# Patient Record
Sex: MALE | Race: WHITE | NOT HISPANIC OR LATINO | Employment: FULL TIME | URBAN - METROPOLITAN AREA
[De-identification: names, ages, dates, MRNs, and addresses within clinical notes are randomized per-mention and may not be internally consistent; named-entity substitution may affect disease eponyms.]

---

## 2020-03-25 ENCOUNTER — APPOINTMENT (OUTPATIENT)
Dept: RADIOLOGY | Facility: CLINIC | Age: 40
End: 2020-03-25
Payer: COMMERCIAL

## 2020-03-25 ENCOUNTER — OFFICE VISIT (OUTPATIENT)
Dept: FAMILY MEDICINE CLINIC | Facility: CLINIC | Age: 40
End: 2020-03-25
Payer: COMMERCIAL

## 2020-03-25 VITALS
BODY MASS INDEX: 22.51 KG/M2 | HEART RATE: 97 BPM | SYSTOLIC BLOOD PRESSURE: 128 MMHG | WEIGHT: 166.2 LBS | TEMPERATURE: 99.2 F | OXYGEN SATURATION: 97 % | DIASTOLIC BLOOD PRESSURE: 80 MMHG | HEIGHT: 72 IN | RESPIRATION RATE: 16 BRPM

## 2020-03-25 DIAGNOSIS — R07.89 CHEST DISCOMFORT: ICD-10-CM

## 2020-03-25 DIAGNOSIS — R07.89 CHEST DISCOMFORT: Primary | ICD-10-CM

## 2020-03-25 DIAGNOSIS — M62.838 TRAPEZIUS MUSCLE SPASM: ICD-10-CM

## 2020-03-25 PROCEDURE — 99203 OFFICE O/P NEW LOW 30 MIN: CPT | Performed by: FAMILY MEDICINE

## 2020-03-25 PROCEDURE — 71046 X-RAY EXAM CHEST 2 VIEWS: CPT

## 2020-03-25 PROCEDURE — 1036F TOBACCO NON-USER: CPT | Performed by: FAMILY MEDICINE

## 2020-03-25 PROCEDURE — 93000 ELECTROCARDIOGRAM COMPLETE: CPT | Performed by: FAMILY MEDICINE

## 2020-03-25 PROCEDURE — 3008F BODY MASS INDEX DOCD: CPT | Performed by: FAMILY MEDICINE

## 2020-03-25 RX ORDER — TIZANIDINE 4 MG/1
4 TABLET ORAL
Qty: 15 TABLET | Refills: 0 | Status: SHIPPED | OUTPATIENT
Start: 2020-03-25
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
## 2022-03-30 ENCOUNTER — TELEPHONE (OUTPATIENT)
Dept: FAMILY MEDICINE CLINIC | Facility: CLINIC | Age: 42
End: 2022-03-30

## 2025-01-13 ENCOUNTER — TELEPHONE (OUTPATIENT)
Dept: FAMILY MEDICINE CLINIC | Facility: CLINIC | Age: 45
End: 2025-01-13

## 2025-01-13 NOTE — TELEPHONE ENCOUNTER
Please remove Dr. Gonzalez as PCP.  Patient established care with [facility].

## 2025-01-26 NOTE — TELEPHONE ENCOUNTER
01/25/25 10:03 PM        The office's request has been received, reviewed, and the patient chart updated. The PCP has successfully been removed with a patient attribution note. This message will now be completed.        Thank you  Avis Harrison

## 2025-04-02 ENCOUNTER — APPOINTMENT (EMERGENCY)
Dept: RADIOLOGY | Facility: HOSPITAL | Age: 45
End: 2025-04-02
Payer: COMMERCIAL

## 2025-04-02 ENCOUNTER — HOSPITAL ENCOUNTER (EMERGENCY)
Facility: HOSPITAL | Age: 45
Discharge: HOME | End: 2025-04-03
Attending: EMERGENCY MEDICINE
Payer: COMMERCIAL

## 2025-04-02 DIAGNOSIS — R00.2 PALPITATIONS: ICD-10-CM

## 2025-04-02 DIAGNOSIS — K21.9 GERD (GASTROESOPHAGEAL REFLUX DISEASE): ICD-10-CM

## 2025-04-02 DIAGNOSIS — R07.9 CHEST PAIN: Primary | ICD-10-CM

## 2025-04-02 LAB
BASOPHILS # BLD AUTO: 0.05 THOUSANDS/ÂΜL (ref 0–0.1)
BASOPHILS NFR BLD AUTO: 1 % (ref 0–1)
EOSINOPHIL # BLD AUTO: 0.32 THOUSAND/ÂΜL (ref 0–0.61)
EOSINOPHIL NFR BLD AUTO: 3 % (ref 0–6)
ERYTHROCYTE [DISTWIDTH] IN BLOOD BY AUTOMATED COUNT: 12.2 % (ref 11.6–15.1)
HCT VFR BLD AUTO: 40.5 % (ref 36.5–49.3)
HGB BLD-MCNC: 13.7 G/DL (ref 12–17)
IMM GRANULOCYTES # BLD AUTO: 0.04 THOUSAND/UL (ref 0–0.2)
IMM GRANULOCYTES NFR BLD AUTO: 0 % (ref 0–2)
LYMPHOCYTES # BLD AUTO: 1.61 THOUSANDS/ÂΜL (ref 0.6–4.47)
LYMPHOCYTES NFR BLD AUTO: 16 % (ref 14–44)
MCH RBC QN AUTO: 30.6 PG (ref 26.8–34.3)
MCHC RBC AUTO-ENTMCNC: 33.8 G/DL (ref 31.4–37.4)
MCV RBC AUTO: 91 FL (ref 82–98)
MONOCYTES # BLD AUTO: 0.99 THOUSAND/ÂΜL (ref 0.17–1.22)
MONOCYTES NFR BLD AUTO: 10 % (ref 4–12)
NEUTROPHILS # BLD AUTO: 6.99 THOUSANDS/ÂΜL (ref 1.85–7.62)
NEUTS SEG NFR BLD AUTO: 70 % (ref 43–75)
NRBC BLD AUTO-RTO: 0 /100 WBCS
PLATELET # BLD AUTO: 319 THOUSANDS/UL (ref 149–390)
PMV BLD AUTO: 10.5 FL (ref 8.9–12.7)
RBC # BLD AUTO: 4.47 MILLION/UL (ref 3.88–5.62)
WBC # BLD AUTO: 10 THOUSAND/UL (ref 4.31–10.16)

## 2025-04-02 PROCEDURE — 71045 X-RAY EXAM CHEST 1 VIEW: CPT

## 2025-04-02 PROCEDURE — 96372 THER/PROPH/DIAG INJ SC/IM: CPT

## 2025-04-02 PROCEDURE — 85025 COMPLETE CBC W/AUTO DIFF WBC: CPT

## 2025-04-02 PROCEDURE — 36415 COLL VENOUS BLD VENIPUNCTURE: CPT

## 2025-04-02 PROCEDURE — 99285 EMERGENCY DEPT VISIT HI MDM: CPT | Performed by: EMERGENCY MEDICINE

## 2025-04-02 PROCEDURE — 80048 BASIC METABOLIC PNL TOTAL CA: CPT

## 2025-04-02 PROCEDURE — 84484 ASSAY OF TROPONIN QUANT: CPT

## 2025-04-02 PROCEDURE — 93005 ELECTROCARDIOGRAM TRACING: CPT

## 2025-04-02 PROCEDURE — 99285 EMERGENCY DEPT VISIT HI MDM: CPT

## 2025-04-02 PROCEDURE — 84443 ASSAY THYROID STIM HORMONE: CPT

## 2025-04-02 RX ORDER — FAMOTIDINE 20 MG/1
20 TABLET, FILM COATED ORAL ONCE
Status: COMPLETED | OUTPATIENT
Start: 2025-04-02 | End: 2025-04-02

## 2025-04-02 RX ORDER — FAMOTIDINE 20 MG/1
20 TABLET, FILM COATED ORAL
Qty: 14 TABLET | Refills: 0 | Status: CANCELLED | OUTPATIENT
Start: 2025-04-02 | End: 2025-04-16

## 2025-04-02 RX ORDER — KETOROLAC TROMETHAMINE 30 MG/ML
15 INJECTION, SOLUTION INTRAMUSCULAR; INTRAVENOUS ONCE
Status: COMPLETED | OUTPATIENT
Start: 2025-04-02 | End: 2025-04-02

## 2025-04-02 RX ORDER — MAGNESIUM HYDROXIDE/ALUMINUM HYDROXICE/SIMETHICONE 120; 1200; 1200 MG/30ML; MG/30ML; MG/30ML
30 SUSPENSION ORAL ONCE
Status: COMPLETED | OUTPATIENT
Start: 2025-04-02 | End: 2025-04-02

## 2025-04-02 RX ORDER — SUCRALFATE ORAL 1 G/10ML
1 SUSPENSION ORAL 4 TIMES DAILY
Qty: 414 ML | Refills: 0 | Status: CANCELLED | OUTPATIENT
Start: 2025-04-02

## 2025-04-02 RX ADMIN — FAMOTIDINE 20 MG: 20 TABLET, FILM COATED ORAL at 23:41

## 2025-04-02 RX ADMIN — ALUMINUM HYDROXIDE, MAGNESIUM HYDROXIDE, AND DIMETHICONE 30 ML: 200; 20; 200 SUSPENSION ORAL at 23:41

## 2025-04-02 RX ADMIN — KETOROLAC TROMETHAMINE 15 MG: 30 INJECTION, SOLUTION INTRAMUSCULAR at 23:41

## 2025-04-02 NOTE — Clinical Note
Antonio Avila was seen and treated in our emergency department on 4/2/2025.    No restrictions    Other - See Comments    N/A    Diagnosis: Chest pain, palpitations    Antonio  is off the rest of the shift today, may return to work on return date.    He may return on this date: 04/04/2025         If you have any questions or concerns, please don't hesitate to call.      Yeison Sahni MD    ______________________________           _______________          _______________  Hospital Representative                              Date                                Time

## 2025-04-03 VITALS
SYSTOLIC BLOOD PRESSURE: 110 MMHG | OXYGEN SATURATION: 95 % | TEMPERATURE: 98.1 F | DIASTOLIC BLOOD PRESSURE: 71 MMHG | HEART RATE: 71 BPM | RESPIRATION RATE: 18 BRPM

## 2025-04-03 LAB
ANION GAP SERPL CALCULATED.3IONS-SCNC: 8 MMOL/L (ref 4–13)
BUN SERPL-MCNC: 19 MG/DL (ref 5–25)
CALCIUM SERPL-MCNC: 9 MG/DL (ref 8.4–10.2)
CARDIAC TROPONIN I PNL SERPL HS: <2 NG/L (ref ?–50)
CHLORIDE SERPL-SCNC: 104 MMOL/L (ref 96–108)
CO2 SERPL-SCNC: 27 MMOL/L (ref 21–32)
CREAT SERPL-MCNC: 0.92 MG/DL (ref 0.6–1.3)
GFR SERPL CREATININE-BSD FRML MDRD: 101 ML/MIN/1.73SQ M
GLUCOSE SERPL-MCNC: 103 MG/DL (ref 65–140)
POTASSIUM SERPL-SCNC: 3.5 MMOL/L (ref 3.5–5.3)
SODIUM SERPL-SCNC: 139 MMOL/L (ref 135–147)
TSH SERPL DL<=0.05 MIU/L-ACNC: 2.02 UIU/ML (ref 0.45–4.5)

## 2025-04-03 RX ORDER — FAMOTIDINE 20 MG/1
20 TABLET, FILM COATED ORAL 2 TIMES DAILY
Qty: 30 TABLET | Refills: 0 | Status: SHIPPED | OUTPATIENT
Start: 2025-04-03

## 2025-04-03 RX ORDER — SUCRALFATE 1 G/1
1 TABLET ORAL
Qty: 60 TABLET | Refills: 0 | Status: SHIPPED | OUTPATIENT
Start: 2025-04-03

## 2025-04-03 NOTE — DISCHARGE INSTRUCTIONS
We recommend following up with your primary care provider in 1 week.  We have provided a GI referral for your longstanding GERD, please call and schedule an appointment at your convenience.    Please return to the ED sooner than later if you begin experiencing blurry vision, difficulty breathing, weakness, excessive fatigue, new or worsening chest pain, vomiting blood.

## 2025-04-03 NOTE — ED PROVIDER NOTES
ED Disposition       None          Assessment & Plan       Medical Decision Making  44-year-old patient with longstanding history of GERD on omeprazole presenting with 1 hour history of chest pain and palpitations.  He has been having intermittent chest pain with similar symptoms for the past year.  Patient was in his usual state of health when he felt left chest pain, patient reported having palpitations heart rate up to 120s.  Differential diagnosis includes pleurodynia, costochondritis, thyroid dysfunction, GERD, peptic ulcer disease, electrolyte abnormality, SVT, A-fib less likely ACS.    EKG revealed sinus rhythm, HR 87 no evidence of STEMI or arrhythmia.  Patient administered GI cocktail with famotidine and Maalox, Toradol.  Workup included CBC, troponin, BMP, chest x-ray, TSH.  Patient signed out to Dr. Yeison Sahni.    Amount and/or Complexity of Data Reviewed  Labs: ordered.  Radiology: ordered.    Risk  OTC drugs.  Prescription drug management.             Medications   famotidine (PEPCID) tablet 20 mg (20 mg Oral Given 4/2/25 2341)   aluminum-magnesium hydroxide-simethicone (MAALOX) oral suspension 30 mL (30 mL Oral Given 4/2/25 2341)   ketorolac (TORADOL) injection 15 mg (15 mg Intramuscular Given 4/2/25 2341)       ED Risk Strat Scores                                                History of Present Illness       Chief Complaint   Patient presents with    Chest Pain     Chest pain intermittently x1 month, tonight pain returned on L side of chest and felt like his heart was racing. Appointment scheduled for Monday with primary physician for symptoms.        History reviewed. No pertinent past medical history.   History reviewed. No pertinent surgical history.   History reviewed. No pertinent family history.   Social History     Tobacco Use    Smoking status: Former     Types: Cigarettes    Smokeless tobacco: Never   Vaping Use    Vaping status: Some Days    Substances: THC   Substance Use Topics     Alcohol use: Not Currently    Drug use: Yes     Types: Marijuana      E-Cigarette/Vaping    E-Cigarette Use Current Some Day User       E-Cigarette/Vaping Substances    THC Yes       I have reviewed and agree with the history as documented.     44-year-old patient with longstanding history of GERD on omeprazole, presenting with 1 hour history of left chest pain and tachycardia.  Patient stated that he was in his usual state of health, did not endorse any particular trigger for this event he described as a sharp nonradiating pain, patient has had ongoing chest pain for the past year that has been worked up as an outpatient by his primary care provider with overall unremarkable workup. Patient stated  patient states that he often wakes up nauseous, coughing after meals.  Does not endorse his chest pain symptoms coinciding with his reflux symptoms.  Regarding his tachycardia patient stated that his heart rate was in the 120s after walking up the stairs.  Patient mentions he has been under a lot of stress recently, and had recently lost a child.  Patient is a daily marijuana smoker.  Patient denies shortness of breath, sick contacts.  Denies headache.          Review of Systems   Constitutional:  Negative for chills and fever.   HENT:  Negative for ear pain and sore throat.    Eyes:  Negative for pain and visual disturbance.   Respiratory:  Negative for cough and shortness of breath.    Cardiovascular:  Positive for chest pain. Negative for palpitations.   Gastrointestinal:  Positive for abdominal pain and nausea. Negative for vomiting.   Genitourinary:  Negative for dysuria and hematuria.   Musculoskeletal:  Negative for arthralgias and back pain.   Skin:  Negative for color change and rash.   Neurological:  Negative for seizures and syncope.   All other systems reviewed and are negative.          Objective       ED Triage Vitals   Temperature Pulse Blood Pressure Respirations SpO2 Patient Position - Orthostatic VS    04/02/25 2254 04/02/25 2248 04/02/25 2248 04/02/25 2248 04/02/25 2248 04/02/25 2248   98.1 °F (36.7 °C) 89 131/94 20 100 % Lying      Temp Source Heart Rate Source BP Location FiO2 (%) Pain Score    04/02/25 2254 04/02/25 2248 04/02/25 2248 -- 04/02/25 2248    Oral Monitor Left arm  3      Vitals      Date and Time Temp Pulse SpO2 Resp BP Pain Score FACES Pain Rating User   04/02/25 2345 -- 77 97 % 18 130/79 -- -- DS   04/02/25 2254 98.1 °F (36.7 °C) -- -- -- -- -- -- KH   04/02/25 2248 -- 89 100 % 20 131/94 3 --             Physical Exam  Vitals and nursing note reviewed.   Constitutional:       General: He is not in acute distress.     Appearance: He is well-developed.   HENT:      Head: Normocephalic and atraumatic.   Eyes:      Conjunctiva/sclera: Conjunctivae normal.   Cardiovascular:      Rate and Rhythm: Normal rate and regular rhythm.      Heart sounds: No murmur heard.  Pulmonary:      Effort: Pulmonary effort is normal. No respiratory distress.      Breath sounds: Normal breath sounds.   Abdominal:      Palpations: Abdomen is soft.      Tenderness: There is no abdominal tenderness.   Musculoskeletal:         General: No swelling.      Cervical back: Neck supple.   Skin:     General: Skin is warm and dry.      Capillary Refill: Capillary refill takes less than 2 seconds.   Neurological:      Mental Status: He is alert.   Psychiatric:         Mood and Affect: Mood normal.         Results Reviewed       Procedure Component Value Units Date/Time    TSH, 3rd generation with Free T4 reflex [[id number]] Collected: 04/02/25 2340    Lab Status: In process Specimen: Blood from Arm, Right Updated: 04/02/25 2354    Basic metabolic panel [[id number]] Collected: 04/02/25 2340    Lab Status: In process Specimen: Blood from Arm, Right Updated: 04/02/25 2354    HS Troponin 0hr (reflex protocol) [[id number]] Collected: 04/02/25 2340    Lab Status: In process Specimen: Blood from Arm, Right Updated: 04/02/25 2354    CBC and  differential [[id number]] Collected: 04/02/25 2340    Lab Status: Final result Specimen: Blood from Arm, Right Updated: 04/02/25 2351     WBC 10.00 Thousand/uL      RBC 4.47 Million/uL      Hemoglobin 13.7 g/dL      Hematocrit 40.5 %      MCV 91 fL      MCH 30.6 pg      MCHC 33.8 g/dL      RDW 12.2 %      MPV 10.5 fL      Platelets 319 Thousands/uL      nRBC 0 /100 WBCs      Segmented % 70 %      Immature Grans % 0 %      Lymphocytes % 16 %      Monocytes % 10 %      Eosinophils Relative 3 %      Basophils Relative 1 %      Absolute Neutrophils 6.99 Thousands/µL      Absolute Immature Grans 0.04 Thousand/uL      Absolute Lymphocytes 1.61 Thousands/µL      Absolute Monocytes 0.99 Thousand/µL      Eosinophils Absolute 0.32 Thousand/µL      Basophils Absolute 0.05 Thousands/µL             XR chest portable    (Results Pending)       Procedures    ED Medication and Procedure Management   None     Patient's Medications    No medications on file     No discharge procedures on file.  ED SEPSIS DOCUMENTATION   Time reflects when diagnosis was documented in both MDM as applicable and the Disposition within this note       Time User Action Codes Description Comment    4/2/2025 11:17 PM Wen Riggs Add [R07.9] Chest pain     4/2/2025 11:17 PM Wen Riggs Add [R07.89] Atypical chest pain     4/2/2025 11:17 PM Wen Riggs Remove [R07.89] Atypical chest pain     4/2/2025 11:17 PM Wen Riggs Remove [R07.9] Chest pain     4/2/2025 11:18 PM Wen Riggs Add [R07.9] Chest pain     4/2/2025 11:39 PM Wen Riggs Add [K21.9] GERD (gastroesophageal reflux disease)                  Wen Riggs MD  04/02/25 1695

## 2025-04-03 NOTE — ED ATTENDING ATTESTATION
4/2/2025  I, Yeison Sahni MD, saw and evaluated the patient. I have discussed the patient with the resident/non-physician practitioner and agree with the resident's/non-physician practitioner's findings, Plan of Care, and MDM as documented in the resident's/non-physician practitioner's note, except where noted. All available labs and Radiology studies were reviewed.  I was present for key portions of any procedure(s) performed by the resident/non-physician practitioner and I was immediately available to provide assistance.       At this point I agree with the current assessment done in the Emergency Department.  I have conducted an independent evaluation of this patient a history and physical is as follows:    Chief Complaint   Patient presents with    Chest Pain     Chest pain intermittently x1 month, tonight pain returned on L side of chest and felt like his heart was racing. Appointment scheduled for Monday with primary physician for symptoms.      Medical Decision Making  45 y/o male presents to the ED for evaluation of chest pain and palpitations.  He states that he has been experiencing intermittent chest pains over the last 1 to 2 months, for which he has followed up with his primary care in the outpatient setting and had a negative workup.  He was referred for outpatient cardiology follow-up but has not yet seen a cardiologist.  He notes that this afternoon the pain returned on the left side of his chest, described as a tightness, which felt similar to his prior episodes over the last 1 to 2 months.  He became concerned due to the fact that approximately 1 to 2 hours ago he started experiencing palpitations associated with the chest tightness, which is a new symptom, and he came to the ED for evaluation.  He reports a history of GERD, for which he takes omeprazole, however he states that his symptoms do not feel similar to his GERD symptoms.  He last ate approximately 3 hours ago, eating chicken shawarma  "with his wife.  He notes recent life stressors over the last year, including buying a new house with his wife and a recent miscarriage, however he feels that he has \"been managing\" he denies feeling more anxious or stressed than usual.  He did not take any medications for his symptoms prior to arrival.  He notes that the chest pains he has been experiencing intermittently occur randomly, do not appear to be associated with eating or exertion, and can occur either on the right side of his chest, the left side of chest or substernally with no apparent pattern.    Vital signs reviewed.  See physical exam documentation for exam findings.  Differential diagnosis includes but is not limited to ACS, arrhythmia, pneumothorax, GERD/gastritis, costochondritis, pleurisy, musculoskeletal pain, anxiety, anemia, and/or electrolyte disturbance.  Will evaluate with ECG, labs, and chest x-ray.  Will trial symptomatic treatment with GI cocktail and ketorolac.  See ED course for independent interpretation of results.  Workup overall negative.  Heart score 0, overall low risk for cardiac disease.  I suspect a component of GERD as well as possibly anxiety and/or musculoskeletal pain. I discussed all findings, treatment, red flags/return precautions, and outpatient follow-up and the patient/family understand and agree. Stable for discharge.    Amount and/or Complexity of Data Reviewed  Labs: ordered. Decision-making details documented in ED Course.  Radiology: ordered and independent interpretation performed. Decision-making details documented in ED Course.    Risk  OTC drugs.  Prescription drug management.          ED Course  ED Course as of 04/03/25 0124   Wed Apr 02, 2025   6209 Procedure Note: EKG  Date/Time: 04/02/25 10:47 PM   Interpreted by: Yeison Sahni MD  Indications / Diagnosis: CP  ECG reviewed by me, the ED Physician: yes   The EKG demonstrates:  Rhythm: normal sinus rhythm 87 bpm  Intervals: normal intervals  Axis: normal " "axis  QRS/Blocks: normal QRS  ST Changes: No STEMI. No acute ST-T wave abnormality. Normal ECG. No prior ECG available for comparison.   u Apr 03, 2025   0002 CBC and differential  All WNL   0052 Basic metabolic panel  All WNL   0052 TSH 3RD GENERATON: 2.017  WNL   0052 hs TnI 0hr: <2  WNL   0107 XR chest portable  NAD       43 y/o male presents to the ED for evaluation of chest pain and palpitations.  He states that he has been experiencing intermittent chest pains over the last 1 to 2 months, for which he has followed up with his primary care in the outpatient setting and had a negative workup.  He was referred for outpatient cardiology follow-up but has not yet seen a cardiologist.  He notes that this afternoon the pain returned on the left side of his chest, described as a tightness, which felt similar to his prior episodes over the last 1 to 2 months.  He became concerned due to the fact that approximately 1 to 2 hours ago he started experiencing palpitations associated with the chest tightness, which is a new symptom, and he came to the ED for evaluation.  He reports a history of GERD, for which he takes omeprazole, however he states that his symptoms do not feel similar to his GERD symptoms.  He last ate approximately 3 hours ago, eating chicken shawarma with his wife.  He notes recent life stressors over the last year, including buying a new house with his wife and a recent miscarriage, however he feels that he has \"been managing\" he denies feeling more anxious or stressed than usual.  He did not take any medications for his symptoms prior to arrival.  He notes that the chest pains he has been experiencing intermittently occur randomly, do not appear to be associated with eating or exertion, and can occur either on the right side of his chest, the left side of chest or substernally with no apparent pattern.        Review of Systems   Constitutional:  Negative for chills and fever.   HENT:  Negative for " congestion, rhinorrhea and sore throat.    Respiratory:  Negative for cough and shortness of breath.    Cardiovascular:  Positive for chest pain and palpitations.   Gastrointestinal:  Negative for abdominal pain, diarrhea, nausea and vomiting.   Genitourinary:  Negative for dysuria and hematuria.   Musculoskeletal:  Negative for back pain and neck pain.   Neurological:  Negative for weakness, light-headedness, numbness and headaches.   All other systems reviewed and are negative.      Physical Exam  Vitals and nursing note reviewed.   Constitutional:       General: He is not in acute distress.     Appearance: Normal appearance. He is well-developed and normal weight. He is not ill-appearing, toxic-appearing or diaphoretic.   HENT:      Head: Normocephalic and atraumatic.      Right Ear: External ear normal.      Left Ear: External ear normal.      Nose: Nose normal.      Mouth/Throat:      Mouth: Mucous membranes are moist.      Pharynx: Oropharynx is clear. No oropharyngeal exudate or posterior oropharyngeal erythema.   Eyes:      Extraocular Movements: Extraocular movements intact.      Conjunctiva/sclera: Conjunctivae normal.      Pupils: Pupils are equal, round, and reactive to light.   Cardiovascular:      Rate and Rhythm: Normal rate and regular rhythm.      Pulses: Normal pulses.      Heart sounds: Normal heart sounds.   Pulmonary:      Effort: Pulmonary effort is normal. No respiratory distress.      Breath sounds: Normal breath sounds. No wheezing or rales.   Chest:      Chest wall: No tenderness.   Abdominal:      General: Abdomen is flat. Bowel sounds are normal. There is no distension.      Palpations: Abdomen is soft.      Tenderness: There is no abdominal tenderness. There is no right CVA tenderness, left CVA tenderness or guarding.   Musculoskeletal:         General: No swelling or tenderness. Normal range of motion.      Cervical back: Normal range of motion and neck supple. No tenderness.      Right  lower leg: No tenderness. No edema.      Left lower leg: No tenderness. No edema.   Skin:     General: Skin is warm and dry.   Neurological:      General: No focal deficit present.      Mental Status: He is alert and oriented to person, place, and time.           Critical Care Time  Procedures

## 2025-04-04 LAB
ATRIAL RATE: 87 BPM
P AXIS: 88 DEGREES
PR INTERVAL: 146 MS
QRS AXIS: 82 DEGREES
QRSD INTERVAL: 82 MS
QT INTERVAL: 362 MS
QTC INTERVAL: 435 MS
T WAVE AXIS: 83 DEGREES
VENTRICULAR RATE: 87 BPM

## 2025-04-04 PROCEDURE — 93010 ELECTROCARDIOGRAM REPORT: CPT | Performed by: INTERNAL MEDICINE
